# Patient Record
Sex: FEMALE | Race: WHITE
[De-identification: names, ages, dates, MRNs, and addresses within clinical notes are randomized per-mention and may not be internally consistent; named-entity substitution may affect disease eponyms.]

---

## 2018-06-08 ENCOUNTER — APPOINTMENT (OUTPATIENT)
Dept: MATERNAL FETAL MEDICINE | Facility: CLINIC | Age: 43
End: 2018-06-08
Payer: COMMERCIAL

## 2018-06-08 VITALS — BODY MASS INDEX: 42.4 KG/M2 | WEIGHT: 210.31 LBS | HEIGHT: 59 IN

## 2018-06-08 DIAGNOSIS — Z78.9 OTHER SPECIFIED HEALTH STATUS: ICD-10-CM

## 2018-06-08 DIAGNOSIS — Z86.32 PERSONAL HISTORY OF GESTATIONAL DIABETES: ICD-10-CM

## 2018-06-08 PROBLEM — Z00.00 ENCOUNTER FOR PREVENTIVE HEALTH EXAMINATION: Status: ACTIVE | Noted: 2018-06-08

## 2018-06-08 PROCEDURE — 99244 OFF/OP CNSLTJ NEW/EST MOD 40: CPT

## 2018-07-05 ENCOUNTER — APPOINTMENT (OUTPATIENT)
Dept: MATERNAL FETAL MEDICINE | Facility: CLINIC | Age: 43
End: 2018-07-05
Payer: COMMERCIAL

## 2018-07-05 ENCOUNTER — ASOB RESULT (OUTPATIENT)
Age: 43
End: 2018-07-05

## 2018-07-05 VITALS — HEIGHT: 59 IN | BODY MASS INDEX: 40.76 KG/M2 | WEIGHT: 202.19 LBS

## 2018-07-05 PROCEDURE — 99242 OFF/OP CONSLTJ NEW/EST SF 20: CPT | Mod: 25

## 2018-07-31 ENCOUNTER — APPOINTMENT (OUTPATIENT)
Dept: MATERNAL FETAL MEDICINE | Facility: CLINIC | Age: 43
End: 2018-07-31
Payer: COMMERCIAL

## 2018-07-31 VITALS — BODY MASS INDEX: 39.41 KG/M2 | WEIGHT: 195.5 LBS | HEIGHT: 59 IN

## 2018-07-31 DIAGNOSIS — E66.01 MORBID (SEVERE) OBESITY DUE TO EXCESS CALORIES: ICD-10-CM

## 2018-07-31 PROCEDURE — 99242 OFF/OP CONSLTJ NEW/EST SF 20: CPT

## 2018-09-24 ENCOUNTER — OTHER (OUTPATIENT)
Age: 43
End: 2018-09-24

## 2018-09-24 ENCOUNTER — APPOINTMENT (OUTPATIENT)
Dept: MATERNAL FETAL MEDICINE | Facility: CLINIC | Age: 43
End: 2018-09-24

## 2019-02-12 ENCOUNTER — APPOINTMENT (OUTPATIENT)
Dept: ENDOCRINOLOGY | Facility: CLINIC | Age: 44
End: 2019-02-12

## 2019-02-12 DIAGNOSIS — Z83.49 FAMILY HISTORY OF OTHER ENDOCRINE, NUTRITIONAL AND METABOLIC DISEASES: ICD-10-CM

## 2019-02-25 ENCOUNTER — TRANSCRIPTION ENCOUNTER (OUTPATIENT)
Age: 44
End: 2019-02-25

## 2019-12-05 ENCOUNTER — APPOINTMENT (OUTPATIENT)
Dept: ENDOCRINOLOGY | Facility: CLINIC | Age: 44
End: 2019-12-05
Payer: COMMERCIAL

## 2019-12-05 VITALS
DIASTOLIC BLOOD PRESSURE: 90 MMHG | HEART RATE: 80 BPM | OXYGEN SATURATION: 94 % | SYSTOLIC BLOOD PRESSURE: 130 MMHG | BODY MASS INDEX: 40.32 KG/M2 | HEIGHT: 59 IN | RESPIRATION RATE: 16 BRPM | WEIGHT: 200 LBS

## 2019-12-05 LAB — GLUCOSE BLDC GLUCOMTR-MCNC: 104

## 2019-12-05 PROCEDURE — 99214 OFFICE O/P EST MOD 30 MIN: CPT | Mod: 25

## 2019-12-05 PROCEDURE — 82962 GLUCOSE BLOOD TEST: CPT
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
## 2019-12-05 NOTE — ASSESSMENT
[FreeTextEntry1] : Given plans for another pregnancy, reviewed risks from uncontrolled DM/hypothyroidism\par - resume metformin  er 500mg 2 tabs qd. advised on off-label use of metformin in pregnancy\par - restart synthroid 50mcg. TSH goal < 2.5\par - monitor urine microalbumin\par - Current approaches to weight management are discussed with the patient. \par Suggested extensive nutritional education program. Proper dietary restrictions and exercise routines discussed. Different dietary plans reviewed. Weight loss goals set up. Patient wants to try an intermittent fasting diet.\par \par RTC 2 months with log book., or sooner if gets pregnant. Labs prior.\par \par

## 2020-02-19 ENCOUNTER — APPOINTMENT (OUTPATIENT)
Dept: ENDOCRINOLOGY | Facility: CLINIC | Age: 45
End: 2020-02-19
Payer: COMMERCIAL

## 2020-02-19 VITALS
OXYGEN SATURATION: 97 % | SYSTOLIC BLOOD PRESSURE: 124 MMHG | HEART RATE: 77 BPM | DIASTOLIC BLOOD PRESSURE: 70 MMHG | HEIGHT: 59 IN | BODY MASS INDEX: 41.53 KG/M2 | RESPIRATION RATE: 16 BRPM | WEIGHT: 206 LBS

## 2020-02-19 LAB — GLUCOSE BLDC GLUCOMTR-MCNC: 90

## 2020-02-19 PROCEDURE — 36415 COLL VENOUS BLD VENIPUNCTURE: CPT

## 2020-02-19 PROCEDURE — 99214 OFFICE O/P EST MOD 30 MIN: CPT | Mod: 25

## 2020-02-19 PROCEDURE — 82962 GLUCOSE BLOOD TEST: CPT
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
## 2020-02-19 NOTE — ASSESSMENT
[FreeTextEntry1] : Given plans for another pregnancy, reviewed risks from uncontrolled DM/hypothyroidism\par - cont metformin  er 500mg 2 tabs qd for now, might uptitrate post labs. advised on off-label use of metformin in pregnancy\par - cont synthroid 50mcg. TSH goal < 2.5\par - monitor urine microalbumin\par - Current approaches to weight management are discussed with the patient. \par Suggested extensive nutritional education program. Proper dietary restrictions and exercise routines discussed. Different dietary plans reviewed. Weight loss goals set up. Patient wants to try an intermittent fasting diet and calorie restriction. If decides against pregnancy, will add Victoza. She'll also see our CDE\par \par RTC 3 months with log book, or sooner if gets pregnant. Labs prior.\par \par

## 2020-02-20 LAB
25(OH)D3 SERPL-MCNC: 32.3 NG/ML
ALBUMIN SERPL ELPH-MCNC: 4.7 G/DL
ALP BLD-CCNC: 56 U/L
ALT SERPL-CCNC: 34 U/L
ANION GAP SERPL CALC-SCNC: 14 MMOL/L
AST SERPL-CCNC: 21 U/L
BILIRUB SERPL-MCNC: 0.2 MG/DL
BUN SERPL-MCNC: 12 MG/DL
CALCIUM SERPL-MCNC: 9.9 MG/DL
CHLORIDE SERPL-SCNC: 100 MMOL/L
CHOLEST SERPL-MCNC: 180 MG/DL
CHOLEST/HDLC SERPL: 3.1 RATIO
CO2 SERPL-SCNC: 23 MMOL/L
CREAT SERPL-MCNC: 0.49 MG/DL
CREAT SPEC-SCNC: 143 MG/DL
ESTIMATED AVERAGE GLUCOSE: 143 MG/DL
FRUCTOSAMINE SERPL-MCNC: 213 UMOL/L
GLUCOSE SERPL-MCNC: 97 MG/DL
HBA1C MFR BLD HPLC: 6.6 %
HDLC SERPL-MCNC: 58 MG/DL
IRON SATN MFR SERPL: 19 %
IRON SERPL-MCNC: 80 UG/DL
LDLC SERPL CALC-MCNC: 103 MG/DL
MICROALBUMIN 24H UR DL<=1MG/L-MCNC: <1.2 MG/DL
MICROALBUMIN/CREAT 24H UR-RTO: NORMAL MG/G
POTASSIUM SERPL-SCNC: 4.1 MMOL/L
PROT SERPL-MCNC: 7.6 G/DL
SODIUM SERPL-SCNC: 137 MMOL/L
T4 FREE SERPL-MCNC: 1.1 NG/DL
TIBC SERPL-MCNC: 425 UG/DL
TRIGL SERPL-MCNC: 95 MG/DL
TSH SERPL-ACNC: 6.84 UIU/ML
UIBC SERPL-MCNC: 345 UG/DL
VIT B12 SERPL-MCNC: 575 PG/ML

## 2020-02-25 ENCOUNTER — APPOINTMENT (OUTPATIENT)
Dept: ENDOCRINOLOGY | Facility: CLINIC | Age: 45
End: 2020-02-25
Payer: COMMERCIAL

## 2020-02-25 PROCEDURE — 97802 MEDICAL NUTRITION INDIV IN: CPT

## 2020-03-10 ENCOUNTER — APPOINTMENT (OUTPATIENT)
Dept: ENDOCRINOLOGY | Facility: CLINIC | Age: 45
End: 2020-03-10

## 2020-08-12 ENCOUNTER — APPOINTMENT (OUTPATIENT)
Dept: ENDOCRINOLOGY | Facility: CLINIC | Age: 45
End: 2020-08-12
Payer: MEDICAID

## 2020-08-12 VITALS
SYSTOLIC BLOOD PRESSURE: 120 MMHG | HEART RATE: 93 BPM | HEIGHT: 59 IN | BODY MASS INDEX: 42.33 KG/M2 | WEIGHT: 210 LBS | OXYGEN SATURATION: 98 % | DIASTOLIC BLOOD PRESSURE: 70 MMHG | RESPIRATION RATE: 17 BRPM

## 2020-08-12 DIAGNOSIS — E66.9 OBESITY, UNSPECIFIED: ICD-10-CM

## 2020-08-12 LAB
BASOPHILS # BLD AUTO: 0.08 K/UL
BASOPHILS NFR BLD AUTO: 1 %
EOSINOPHIL # BLD AUTO: 0.61 K/UL
EOSINOPHIL NFR BLD AUTO: 7.5 %
ESTIMATED AVERAGE GLUCOSE: 169 MG/DL
FRUCTOSAMINE SERPL-MCNC: 249 UMOL/L
GLUCOSE BLDC GLUCOMTR-MCNC: 137
HBA1C MFR BLD HPLC: 7.5 %
HCT VFR BLD CALC: 44.9 %
HGB BLD-MCNC: 14.1 G/DL
IMM GRANULOCYTES NFR BLD AUTO: 0.2 %
LYMPHOCYTES # BLD AUTO: 2.08 K/UL
LYMPHOCYTES NFR BLD AUTO: 25.4 %
MAN DIFF?: NORMAL
MCHC RBC-ENTMCNC: 28 PG
MCHC RBC-ENTMCNC: 31.4 GM/DL
MCV RBC AUTO: 89.1 FL
MONOCYTES # BLD AUTO: 0.53 K/UL
MONOCYTES NFR BLD AUTO: 6.5 %
NEUTROPHILS # BLD AUTO: 4.86 K/UL
NEUTROPHILS NFR BLD AUTO: 59.4 %
PLATELET # BLD AUTO: 340 K/UL
RBC # BLD: 5.04 M/UL
RBC # FLD: 13.9 %
WBC # FLD AUTO: 8.18 K/UL

## 2020-08-12 PROCEDURE — 99215 OFFICE O/P EST HI 40 MIN: CPT | Mod: 25

## 2020-08-12 PROCEDURE — 36415 COLL VENOUS BLD VENIPUNCTURE: CPT

## 2020-08-12 PROCEDURE — 82962 GLUCOSE BLOOD TEST: CPT

## 2020-08-12 NOTE — ASSESSMENT
[Diabetic Medications] : Risks and benefits of diabetic medications were discussed [FreeTextEntry1] : Given plans for another pregnancy, reviewed risks from uncontrolled DM/hypothyroidism\par - labs today and resume metformin. Likely will uptitrate to 2000 mg qd\par - advised on off-label use of metformin in pregnancy. Patient is still deciding whether she wants to proceed with pregnancy plans\par - cont synthroid 75 mcg. TSH goal < 2.5\par - monitor urine microalbumin\par - Current approaches to weight management are discussed with the patient. \par Suggested extensive nutritional education program. Proper dietary restrictions and exercise routines discussed. Different dietary plans reviewed. Weight loss goals set up. Patient wants to try an intermittent fasting diet and calorie restriction. If decides against pregnancy, will add Victoza or Rybelsus. She'll f/u with our CDE.\par \par RTC 3 months with log book, or sooner if gets pregnant. Labs prior.\par \par
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
## 2020-08-13 LAB
25(OH)D3 SERPL-MCNC: 44.2 NG/ML
ALBUMIN SERPL ELPH-MCNC: 4.7 G/DL
ALP BLD-CCNC: 52 U/L
ALT SERPL-CCNC: 56 U/L
ANION GAP SERPL CALC-SCNC: 16 MMOL/L
AST SERPL-CCNC: 43 U/L
BILIRUB SERPL-MCNC: 0.2 MG/DL
BUN SERPL-MCNC: 10 MG/DL
CALCIUM SERPL-MCNC: 9.5 MG/DL
CHLORIDE SERPL-SCNC: 102 MMOL/L
CHOLEST SERPL-MCNC: 172 MG/DL
CHOLEST/HDLC SERPL: 3 RATIO
CO2 SERPL-SCNC: 22 MMOL/L
CREAT SERPL-MCNC: 0.49 MG/DL
CREAT SPEC-SCNC: 142 MG/DL
FOLATE SERPL-MCNC: >20 NG/ML
GLUCOSE SERPL-MCNC: 142 MG/DL
HDLC SERPL-MCNC: 57 MG/DL
LDLC SERPL CALC-MCNC: 97 MG/DL
MICROALBUMIN 24H UR DL<=1MG/L-MCNC: 4.3 MG/DL
MICROALBUMIN/CREAT 24H UR-RTO: 30 MG/G
POTASSIUM SERPL-SCNC: 4.4 MMOL/L
PROT SERPL-MCNC: 7.3 G/DL
SODIUM SERPL-SCNC: 140 MMOL/L
T4 FREE SERPL-MCNC: 1.1 NG/DL
TRIGL SERPL-MCNC: 89 MG/DL
TSH SERPL-ACNC: 4.79 UIU/ML
VIT B12 SERPL-MCNC: 997 PG/ML

## 2020-09-15 ENCOUNTER — APPOINTMENT (OUTPATIENT)
Dept: ENDOCRINOLOGY | Facility: CLINIC | Age: 45
End: 2020-09-15

## 2020-11-12 ENCOUNTER — APPOINTMENT (OUTPATIENT)
Dept: ENDOCRINOLOGY | Facility: CLINIC | Age: 45
End: 2020-11-12
Payer: MEDICAID

## 2020-11-12 VITALS
SYSTOLIC BLOOD PRESSURE: 112 MMHG | HEART RATE: 95 BPM | BODY MASS INDEX: 41.93 KG/M2 | TEMPERATURE: 98.3 F | DIASTOLIC BLOOD PRESSURE: 70 MMHG | RESPIRATION RATE: 17 BRPM | OXYGEN SATURATION: 97 % | WEIGHT: 208 LBS | HEIGHT: 59 IN

## 2020-11-12 LAB — GLUCOSE BLDC GLUCOMTR-MCNC: 161

## 2020-11-12 PROCEDURE — 36415 COLL VENOUS BLD VENIPUNCTURE: CPT

## 2020-11-12 PROCEDURE — 82962 GLUCOSE BLOOD TEST: CPT

## 2020-11-12 PROCEDURE — 99214 OFFICE O/P EST MOD 30 MIN: CPT | Mod: 25

## 2020-11-12 PROCEDURE — 99072 ADDL SUPL MATRL&STAF TM PHE: CPT
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
## 2020-11-12 NOTE — ASSESSMENT
[Diabetic Medications] : Risks and benefits of diabetic medications were discussed [FreeTextEntry1] : Reviewed risks from uncontrolled DM/hypothyroidism\par - at present patient wants to focus on her DM management; advised on c/ceptive options\par - labs today\par - resume metformin er 500 mg 2 tabs w/ dinner and uptitrate as tolerated\par - advised on GLP1 agonists ; she's reluctant or use injectables. Rybelsus is not covered. Will try Steglatro 5 mg qd\par - cont synthroid 88 mcg. TSH goal < 2.5\par - monitor urine microalbumin\par - Current approaches to weight management are discussed with the patient. \par Suggested extensive nutritional education program. Proper dietary restrictions and exercise routines discussed. Different dietary plans reviewed. Weight loss goals set up.She'll f/u with our CDE and will refer to a bariatric surgeon\par \par RTC 3 months with log book\par \par

## 2020-11-13 LAB
25(OH)D3 SERPL-MCNC: 48.8 NG/ML
ALBUMIN SERPL ELPH-MCNC: 4.8 G/DL
ALP BLD-CCNC: 59 U/L
ALT SERPL-CCNC: 44 U/L
ANION GAP SERPL CALC-SCNC: 16 MMOL/L
AST SERPL-CCNC: 28 U/L
BILIRUB SERPL-MCNC: 0.2 MG/DL
BUN SERPL-MCNC: 13 MG/DL
CALCIUM SERPL-MCNC: 9.9 MG/DL
CHLORIDE SERPL-SCNC: 100 MMOL/L
CHOLEST SERPL-MCNC: 149 MG/DL
CO2 SERPL-SCNC: 23 MMOL/L
CREAT SERPL-MCNC: 0.55 MG/DL
CREAT SPEC-SCNC: 142 MG/DL
ESTIMATED AVERAGE GLUCOSE: 186 MG/DL
FOLATE SERPL-MCNC: 10.9 NG/ML
FRUCTOSAMINE SERPL-MCNC: 265 UMOL/L
GLUCOSE SERPL-MCNC: 158 MG/DL
HBA1C MFR BLD HPLC: 8.1 %
HDLC SERPL-MCNC: 58 MG/DL
LDLC SERPL CALC-MCNC: 79 MG/DL
MICROALBUMIN 24H UR DL<=1MG/L-MCNC: <1.2 MG/DL
MICROALBUMIN/CREAT 24H UR-RTO: NORMAL MG/G
NONHDLC SERPL-MCNC: 92 MG/DL
POTASSIUM SERPL-SCNC: 4.5 MMOL/L
PROT SERPL-MCNC: 7.3 G/DL
SODIUM SERPL-SCNC: 140 MMOL/L
T4 FREE SERPL-MCNC: 1.3 NG/DL
TRIGL SERPL-MCNC: 64 MG/DL
TSH SERPL-ACNC: 2.93 UIU/ML
VIT B12 SERPL-MCNC: 1657 PG/ML

## 2020-11-30 ENCOUNTER — APPOINTMENT (OUTPATIENT)
Dept: ENDOCRINOLOGY | Facility: CLINIC | Age: 45
End: 2020-11-30
Payer: MEDICAID

## 2020-11-30 PROCEDURE — 97802 MEDICAL NUTRITION INDIV IN: CPT

## 2020-12-14 ENCOUNTER — APPOINTMENT (OUTPATIENT)
Dept: ENDOCRINOLOGY | Facility: CLINIC | Age: 45
End: 2020-12-14

## 2021-02-22 ENCOUNTER — APPOINTMENT (OUTPATIENT)
Dept: ENDOCRINOLOGY | Facility: CLINIC | Age: 46
End: 2021-02-22
Payer: MEDICAID

## 2021-02-22 VITALS
SYSTOLIC BLOOD PRESSURE: 125 MMHG | DIASTOLIC BLOOD PRESSURE: 80 MMHG | WEIGHT: 209 LBS | BODY MASS INDEX: 42.13 KG/M2 | RESPIRATION RATE: 17 BRPM | TEMPERATURE: 98.1 F | OXYGEN SATURATION: 98 % | HEIGHT: 59 IN | HEART RATE: 98 BPM

## 2021-02-22 LAB — GLUCOSE BLDC GLUCOMTR-MCNC: 107

## 2021-02-22 PROCEDURE — 36415 COLL VENOUS BLD VENIPUNCTURE: CPT

## 2021-02-22 PROCEDURE — 82962 GLUCOSE BLOOD TEST: CPT

## 2021-02-22 PROCEDURE — 99072 ADDL SUPL MATRL&STAF TM PHE: CPT

## 2021-02-22 PROCEDURE — 99214 OFFICE O/P EST MOD 30 MIN: CPT | Mod: 25

## 2021-02-22 NOTE — ASSESSMENT
[Diabetic Medications] : Risks and benefits of diabetic medications were discussed [FreeTextEntry1] : Reviewed risks from uncontrolled DM/hypothyroidism\par - at present patient wants to focus on her DM management; advised on c/ceptive options\par - labs today \par - resume metformin er 500 mg 2 tabs w/ dinner and uptitrate as tolerated\par - advised on GLP1 agonists ; she's reluctant or use injectables. will reapply for Rybelsus since she has a new insurance. \par - cont synthroid 100 mcg. TSH goal < 2.5\par - monitor urine microalbumin\par - Current approaches to weight management are discussed with the patient. \par Suggested extensive nutritional education program. Proper dietary restrictions and exercise routines discussed. Different dietary plans reviewed. Weight loss goals set up.She'll f/u with our CDE and will refer to a bariatric surgeon\par \par RTC 3 months with log book\par \par
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
## 2021-02-23 LAB
25(OH)D3 SERPL-MCNC: 32.9 NG/ML
ALBUMIN SERPL ELPH-MCNC: 4.4 G/DL
ALP BLD-CCNC: 73 U/L
ALT SERPL-CCNC: 51 U/L
ANION GAP SERPL CALC-SCNC: 15 MMOL/L
AST SERPL-CCNC: 43 U/L
BILIRUB SERPL-MCNC: 0.3 MG/DL
BUN SERPL-MCNC: 12 MG/DL
CALCIUM SERPL-MCNC: 9.6 MG/DL
CHLORIDE SERPL-SCNC: 100 MMOL/L
CHOLEST SERPL-MCNC: 177 MG/DL
CO2 SERPL-SCNC: 23 MMOL/L
CREAT SERPL-MCNC: 0.65 MG/DL
CREAT SPEC-SCNC: 212 MG/DL
ESTIMATED AVERAGE GLUCOSE: 183 MG/DL
FOLATE SERPL-MCNC: 9.3 NG/ML
FRUCTOSAMINE SERPL-MCNC: 252 UMOL/L
GLUCOSE SERPL-MCNC: 110 MG/DL
HBA1C MFR BLD HPLC: 8 %
HDLC SERPL-MCNC: 51 MG/DL
LDLC SERPL CALC-MCNC: 110 MG/DL
MICROALBUMIN 24H UR DL<=1MG/L-MCNC: 3.6 MG/DL
MICROALBUMIN/CREAT 24H UR-RTO: 17 MG/G
NONHDLC SERPL-MCNC: 126 MG/DL
POTASSIUM SERPL-SCNC: 4.1 MMOL/L
PROT SERPL-MCNC: 7.5 G/DL
SODIUM SERPL-SCNC: 137 MMOL/L
T4 FREE SERPL-MCNC: 1.3 NG/DL
TRIGL SERPL-MCNC: 85 MG/DL
TSH SERPL-ACNC: 2.69 UIU/ML
VIT B12 SERPL-MCNC: >2000 PG/ML

## 2021-03-08 ENCOUNTER — TRANSCRIPTION ENCOUNTER (OUTPATIENT)
Age: 46
End: 2021-03-08

## 2021-06-10 ENCOUNTER — APPOINTMENT (OUTPATIENT)
Dept: ENDOCRINOLOGY | Facility: CLINIC | Age: 46
End: 2021-06-10
Payer: MEDICAID

## 2021-06-10 VITALS
RESPIRATION RATE: 17 BRPM | HEIGHT: 59 IN | WEIGHT: 209 LBS | BODY MASS INDEX: 42.13 KG/M2 | OXYGEN SATURATION: 98 % | HEART RATE: 84 BPM | TEMPERATURE: 97.9 F | DIASTOLIC BLOOD PRESSURE: 70 MMHG | SYSTOLIC BLOOD PRESSURE: 120 MMHG

## 2021-06-10 DIAGNOSIS — E11.69 TYPE 2 DIABETES MELLITUS WITH OTHER SPECIFIED COMPLICATION: ICD-10-CM

## 2021-06-10 DIAGNOSIS — E66.9 TYPE 2 DIABETES MELLITUS WITH OTHER SPECIFIED COMPLICATION: ICD-10-CM

## 2021-06-10 LAB
25(OH)D3 SERPL-MCNC: 32.1 NG/ML
ALBUMIN SERPL ELPH-MCNC: 4.4 G/DL
ALP BLD-CCNC: 61 U/L
ALT SERPL-CCNC: 36 U/L
ANION GAP SERPL CALC-SCNC: 15 MMOL/L
AST SERPL-CCNC: 23 U/L
BILIRUB SERPL-MCNC: 0.3 MG/DL
BUN SERPL-MCNC: 11 MG/DL
CALCIUM SERPL-MCNC: 9.4 MG/DL
CHLORIDE SERPL-SCNC: 102 MMOL/L
CHOLEST SERPL-MCNC: 159 MG/DL
CO2 SERPL-SCNC: 22 MMOL/L
CREAT SERPL-MCNC: 0.53 MG/DL
CREAT SPEC-SCNC: 141 MG/DL
ESTIMATED AVERAGE GLUCOSE: 174 MG/DL
FOLATE SERPL-MCNC: 6.4 NG/ML
FRUCTOSAMINE SERPL-MCNC: 252 UMOL/L
GLUCOSE BLDC GLUCOMTR-MCNC: 146
GLUCOSE SERPL-MCNC: 154 MG/DL
HBA1C MFR BLD HPLC: 7.7 %
HDLC SERPL-MCNC: 55 MG/DL
LDLC SERPL CALC-MCNC: 92 MG/DL
MICROALBUMIN 24H UR DL<=1MG/L-MCNC: 6.5 MG/DL
MICROALBUMIN/CREAT 24H UR-RTO: 46 MG/G
NONHDLC SERPL-MCNC: 104 MG/DL
POTASSIUM SERPL-SCNC: 4.6 MMOL/L
PROT SERPL-MCNC: 6.9 G/DL
SODIUM SERPL-SCNC: 138 MMOL/L
T4 FREE SERPL-MCNC: 0.9 NG/DL
TRIGL SERPL-MCNC: 61 MG/DL
TSH SERPL-ACNC: 6.35 UIU/ML
VIT B12 SERPL-MCNC: 871 PG/ML

## 2021-06-10 PROCEDURE — 99072 ADDL SUPL MATRL&STAF TM PHE: CPT

## 2021-06-10 PROCEDURE — 99214 OFFICE O/P EST MOD 30 MIN: CPT | Mod: 25

## 2021-06-10 PROCEDURE — 36415 COLL VENOUS BLD VENIPUNCTURE: CPT

## 2021-06-10 PROCEDURE — 82962 GLUCOSE BLOOD TEST: CPT

## 2021-06-10 RX ORDER — DULAGLUTIDE 0.75 MG/.5ML
0.75 INJECTION, SOLUTION SUBCUTANEOUS
Qty: 4 | Refills: 11 | Status: DISCONTINUED | COMMUNITY
Start: 2020-11-30 | End: 2021-06-10

## 2021-06-10 NOTE — ASSESSMENT
[Diabetic Medications] : Risks and benefits of diabetic medications were discussed [FreeTextEntry1] : Reviewed risks from uncontrolled DM/hypothyroidism\par compliance is reiterated\par - at present patient wants to focus on her DM management; advised on c/ceptive options\par - labs today \par - resume metformin er 500 mg 2 tabs w/ dinner and uptitrate as tolerated\par - resume Rybelsus 3 mg qd for 1 mo, then can increase to 7 mg\par - resume synthroid 100 mcg. TSH goal < 2.5\par - monitor urine microalbumin\par - Current approaches to weight management are discussed with the patient. \par Suggested extensive nutritional education program. Proper dietary restrictions and exercise routines discussed. Different dietary plans reviewed. Weight loss goals set up.She'll f/u with our CDE and will refer to a bariatric surgeon\par \par RTC 3 months with log book\par \par
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
## 2021-10-07 ENCOUNTER — APPOINTMENT (OUTPATIENT)
Dept: ENDOCRINOLOGY | Facility: CLINIC | Age: 46
End: 2021-10-07
Payer: MEDICAID

## 2021-10-07 VITALS
BODY MASS INDEX: 43.14 KG/M2 | RESPIRATION RATE: 17 BRPM | HEIGHT: 59 IN | TEMPERATURE: 98 F | SYSTOLIC BLOOD PRESSURE: 140 MMHG | DIASTOLIC BLOOD PRESSURE: 86 MMHG | HEART RATE: 76 BPM | OXYGEN SATURATION: 98 % | WEIGHT: 214 LBS

## 2021-10-07 LAB
ALBUMIN SERPL ELPH-MCNC: 4.7 G/DL
ALP BLD-CCNC: 70 U/L
ALT SERPL-CCNC: 54 U/L
ANION GAP SERPL CALC-SCNC: 14 MMOL/L
AST SERPL-CCNC: 48 U/L
BASOPHILS # BLD AUTO: 0.06 K/UL
BASOPHILS NFR BLD AUTO: 0.8 %
BILIRUB SERPL-MCNC: 0.3 MG/DL
BUN SERPL-MCNC: 14 MG/DL
CALCIUM SERPL-MCNC: 10.3 MG/DL
CHLORIDE SERPL-SCNC: 100 MMOL/L
CHOLEST SERPL-MCNC: 164 MG/DL
CO2 SERPL-SCNC: 25 MMOL/L
CREAT SERPL-MCNC: 0.69 MG/DL
EOSINOPHIL # BLD AUTO: 0.74 K/UL
EOSINOPHIL NFR BLD AUTO: 10.4 %
ESTIMATED AVERAGE GLUCOSE: 203 MG/DL
FOLATE SERPL-MCNC: 9.8 NG/ML
FRUCTOSAMINE SERPL-MCNC: 269 UMOL/L
GLUCOSE BLDC GLUCOMTR-MCNC: 134
GLUCOSE SERPL-MCNC: 126 MG/DL
HBA1C MFR BLD HPLC: 8.7 %
HCT VFR BLD CALC: 46.7 %
HDLC SERPL-MCNC: 49 MG/DL
HGB BLD-MCNC: 14.4 G/DL
IMM GRANULOCYTES NFR BLD AUTO: 0.1 %
LDLC SERPL CALC-MCNC: 102 MG/DL
LYMPHOCYTES # BLD AUTO: 1.94 K/UL
LYMPHOCYTES NFR BLD AUTO: 27.2 %
MAN DIFF?: NORMAL
MCHC RBC-ENTMCNC: 26.9 PG
MCHC RBC-ENTMCNC: 30.8 GM/DL
MCV RBC AUTO: 87.1 FL
MONOCYTES # BLD AUTO: 0.52 K/UL
MONOCYTES NFR BLD AUTO: 7.3 %
NEUTROPHILS # BLD AUTO: 3.85 K/UL
NEUTROPHILS NFR BLD AUTO: 54.2 %
NONHDLC SERPL-MCNC: 115 MG/DL
PLATELET # BLD AUTO: 371 K/UL
POTASSIUM SERPL-SCNC: 5.1 MMOL/L
PROT SERPL-MCNC: 7.8 G/DL
RBC # BLD: 5.36 M/UL
RBC # FLD: 13.5 %
SODIUM SERPL-SCNC: 139 MMOL/L
T4 FREE SERPL-MCNC: 1.1 NG/DL
TRIGL SERPL-MCNC: 66 MG/DL
TSH SERPL-ACNC: 8.45 UIU/ML
VIT B12 SERPL-MCNC: 753 PG/ML
WBC # FLD AUTO: 7.12 K/UL

## 2021-10-07 PROCEDURE — 99214 OFFICE O/P EST MOD 30 MIN: CPT | Mod: 25

## 2021-10-07 PROCEDURE — 82962 GLUCOSE BLOOD TEST: CPT

## 2021-10-07 PROCEDURE — 36415 COLL VENOUS BLD VENIPUNCTURE: CPT

## 2021-10-07 RX ORDER — ERTUGLIFLOZIN 5 MG/1
5 TABLET, FILM COATED ORAL
Qty: 30 | Refills: 6 | Status: ACTIVE | COMMUNITY
Start: 2020-11-12 | End: 1900-01-01
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
## 2021-10-07 NOTE — ASSESSMENT
[Diabetic Medications] : Risks and benefits of diabetic medications were discussed [FreeTextEntry1] : Reviewed risks from uncontrolled DM/hypothyroidism\par compliance is reiterated\par - at present patient wants to focus on her DM management; advised on c/ceptive options\par - labs today \par - increase Rybelsus 14 mg; will likely need to resume metformin er 500 mg 2 tabs w/ dinner\par - synthroid 100 mcg. TSH goal < 2.5\par - monitor urine microalbumin\par - Current approaches to weight management are discussed with the patient. \par Suggested extensive nutritional education program. Proper dietary restrictions and exercise routines discussed. Different dietary plans reviewed. Weight loss goals set up.She'll f/u with our CDE and will refer to a bariatric surgeon. We discussed different weight loss meds ans Plenity, she'll research\par \par RTC 3 months with log book\par \par

## 2021-10-08 ENCOUNTER — TRANSCRIPTION ENCOUNTER (OUTPATIENT)
Age: 46
End: 2021-10-08

## 2021-10-11 ENCOUNTER — TRANSCRIPTION ENCOUNTER (OUTPATIENT)
Age: 46
End: 2021-10-11

## 2021-10-12 ENCOUNTER — APPOINTMENT (OUTPATIENT)
Dept: ENDOCRINOLOGY | Facility: CLINIC | Age: 46
End: 2021-10-12

## 2021-10-25 ENCOUNTER — TRANSCRIPTION ENCOUNTER (OUTPATIENT)
Age: 46
End: 2021-10-25

## 2021-11-05 ENCOUNTER — TRANSCRIPTION ENCOUNTER (OUTPATIENT)
Age: 46
End: 2021-11-05

## 2021-11-22 ENCOUNTER — TRANSCRIPTION ENCOUNTER (OUTPATIENT)
Age: 46
End: 2021-11-22

## 2022-01-27 ENCOUNTER — TRANSCRIPTION ENCOUNTER (OUTPATIENT)
Age: 47
End: 2022-01-27

## 2022-02-03 ENCOUNTER — LABORATORY RESULT (OUTPATIENT)
Age: 47
End: 2022-02-03

## 2022-02-03 ENCOUNTER — APPOINTMENT (OUTPATIENT)
Dept: ENDOCRINOLOGY | Facility: CLINIC | Age: 47
End: 2022-02-03
Payer: COMMERCIAL

## 2022-02-03 VITALS
HEIGHT: 59 IN | SYSTOLIC BLOOD PRESSURE: 150 MMHG | OXYGEN SATURATION: 97 % | WEIGHT: 218 LBS | BODY MASS INDEX: 43.95 KG/M2 | DIASTOLIC BLOOD PRESSURE: 110 MMHG | RESPIRATION RATE: 17 BRPM | HEART RATE: 94 BPM | TEMPERATURE: 97.7 F

## 2022-02-03 LAB — GLUCOSE BLDC GLUCOMTR-MCNC: 213

## 2022-02-03 PROCEDURE — 82962 GLUCOSE BLOOD TEST: CPT

## 2022-02-03 PROCEDURE — 36415 COLL VENOUS BLD VENIPUNCTURE: CPT

## 2022-02-03 PROCEDURE — 99215 OFFICE O/P EST HI 40 MIN: CPT | Mod: 25

## 2022-02-03 RX ORDER — ORAL SEMAGLUTIDE 7 MG/1
7 TABLET ORAL
Qty: 3 | Refills: 3 | Status: DISCONTINUED | COMMUNITY
Start: 2021-02-22 | End: 2022-02-03
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
## 2022-02-03 NOTE — ASSESSMENT
[Diabetic Medications] : Risks and benefits of diabetic medications were discussed [FreeTextEntry1] : 1. T2DM, severely uncontrolled\par Reviewed risks from uncontrolled DM\par compliance is reiterated\par - at present patient wants to focus on her DM management; advised on c/ceptive options\par - labs today \par - advised on Bethanie PRO, insulin- she declined\par - resume  metformin er 500 mg 2 tabs w/ dinner, and uptitrate to 4 tabs as tolerated\par - Ozempic 0.25 gm q and uptitrate\par - Amaryl 2 mg bid\par - will not use SGLT2 inhibitors for now b/o UTI's\par \par 2. Hypothyroidisim\par - advised on danger of uncontrolled hypothyroidism\par - resume synthroid 100 mcg. TSH goal < 2.5\par - monitor urine microalbumin\par \par 3. Morbid obesity\par - Current approaches to weight management are discussed with the patient. \par Suggested extensive nutritional education program. Proper dietary restrictions and exercise routines discussed. Different dietary plans reviewed. Weight loss goals set up.She'll f/u with our CDE and will refer to a bariatric surgeon again. We discussed different weight loss meds ans Plenity, she'll research\par - 24h UFC\par \par RTC 3 months with log book\par \par

## 2022-02-04 ENCOUNTER — TRANSCRIPTION ENCOUNTER (OUTPATIENT)
Age: 47
End: 2022-02-04

## 2022-02-04 LAB
25(OH)D3 SERPL-MCNC: 26 NG/ML
ALBUMIN SERPL ELPH-MCNC: 4.7 G/DL
ALP BLD-CCNC: 70 U/L
ALT SERPL-CCNC: 75 U/L
ANION GAP SERPL CALC-SCNC: 18 MMOL/L
APPEARANCE: ABNORMAL
AST SERPL-CCNC: 57 U/L
BASOPHILS # BLD AUTO: 0.07 K/UL
BASOPHILS NFR BLD AUTO: 1 %
BILIRUB SERPL-MCNC: 0.2 MG/DL
BILIRUBIN URINE: NEGATIVE
BLOOD URINE: NEGATIVE
BUN SERPL-MCNC: 14 MG/DL
CALCIUM SERPL-MCNC: 9.9 MG/DL
CHLORIDE SERPL-SCNC: 99 MMOL/L
CHOLEST SERPL-MCNC: 160 MG/DL
CO2 SERPL-SCNC: 21 MMOL/L
COLOR: YELLOW
CREAT SERPL-MCNC: 0.48 MG/DL
EOSINOPHIL # BLD AUTO: 0.75 K/UL
EOSINOPHIL NFR BLD AUTO: 10.9 %
ESTIMATED AVERAGE GLUCOSE: 232 MG/DL
FOLATE SERPL-MCNC: 8.8 NG/ML
FRUCTOSAMINE SERPL-MCNC: 314 UMOL/L
GLUCOSE QUALITATIVE U: NORMAL
GLUCOSE SERPL-MCNC: 217 MG/DL
HBA1C MFR BLD HPLC: 9.7 %
HCT VFR BLD CALC: 46 %
HDLC SERPL-MCNC: 55 MG/DL
HGB BLD-MCNC: 14.1 G/DL
IMM GRANULOCYTES NFR BLD AUTO: 0.4 %
KETONES URINE: ABNORMAL
LDLC SERPL CALC-MCNC: 88 MG/DL
LEUKOCYTE ESTERASE URINE: ABNORMAL
LYMPHOCYTES # BLD AUTO: 1.85 K/UL
LYMPHOCYTES NFR BLD AUTO: 26.9 %
MAN DIFF?: NORMAL
MCHC RBC-ENTMCNC: 27.2 PG
MCHC RBC-ENTMCNC: 30.7 GM/DL
MCV RBC AUTO: 88.8 FL
MONOCYTES # BLD AUTO: 0.41 K/UL
MONOCYTES NFR BLD AUTO: 6 %
NEUTROPHILS # BLD AUTO: 3.78 K/UL
NEUTROPHILS NFR BLD AUTO: 54.8 %
NITRITE URINE: NEGATIVE
NONHDLC SERPL-MCNC: 106 MG/DL
PH URINE: 5.5
PLATELET # BLD AUTO: 328 K/UL
POTASSIUM SERPL-SCNC: 4.3 MMOL/L
PROT SERPL-MCNC: 7.4 G/DL
PROTEIN URINE: ABNORMAL
RBC # BLD: 5.18 M/UL
RBC # FLD: 13.9 %
SODIUM SERPL-SCNC: 138 MMOL/L
SPECIFIC GRAVITY URINE: 1.03
T4 FREE SERPL-MCNC: 1 NG/DL
TRIGL SERPL-MCNC: 86 MG/DL
TSH SERPL-ACNC: 5.81 UIU/ML
UROBILINOGEN URINE: NORMAL
VIT B12 SERPL-MCNC: 657 PG/ML
WBC # FLD AUTO: 6.89 K/UL

## 2022-02-08 ENCOUNTER — TRANSCRIPTION ENCOUNTER (OUTPATIENT)
Age: 47
End: 2022-02-08

## 2022-02-14 ENCOUNTER — APPOINTMENT (OUTPATIENT)
Dept: ENDOCRINOLOGY | Facility: CLINIC | Age: 47
End: 2022-02-14
Payer: COMMERCIAL

## 2022-02-14 VITALS — HEIGHT: 59 IN | BODY MASS INDEX: 43.55 KG/M2 | WEIGHT: 216 LBS

## 2022-02-14 PROCEDURE — G0108 DIAB MANAGE TRN  PER INDIV: CPT

## 2022-03-01 ENCOUNTER — APPOINTMENT (OUTPATIENT)
Dept: BARIATRICS | Facility: CLINIC | Age: 47
End: 2022-03-01

## 2022-03-14 ENCOUNTER — APPOINTMENT (OUTPATIENT)
Dept: ENDOCRINOLOGY | Facility: CLINIC | Age: 47
End: 2022-03-14
Payer: COMMERCIAL

## 2022-03-14 VITALS — BODY MASS INDEX: 43.55 KG/M2 | HEIGHT: 59 IN | WEIGHT: 216 LBS

## 2022-03-14 PROCEDURE — G0108 DIAB MANAGE TRN  PER INDIV: CPT

## 2022-04-25 ENCOUNTER — APPOINTMENT (OUTPATIENT)
Dept: ENDOCRINOLOGY | Facility: CLINIC | Age: 47
End: 2022-04-25

## 2022-08-03 ENCOUNTER — RESULT REVIEW (OUTPATIENT)
Age: 47
End: 2022-08-03

## 2022-10-06 ENCOUNTER — RX RENEWAL (OUTPATIENT)
Age: 47
End: 2022-10-06

## 2023-01-11 ENCOUNTER — NON-APPOINTMENT (OUTPATIENT)
Age: 48
End: 2023-01-11

## 2023-01-23 ENCOUNTER — APPOINTMENT (OUTPATIENT)
Dept: ENDOCRINOLOGY | Facility: CLINIC | Age: 48
End: 2023-01-23

## 2023-09-20 ENCOUNTER — LABORATORY RESULT (OUTPATIENT)
Age: 48
End: 2023-09-20

## 2023-09-20 ENCOUNTER — APPOINTMENT (OUTPATIENT)
Dept: ENDOCRINOLOGY | Facility: CLINIC | Age: 48
End: 2023-09-20
Payer: COMMERCIAL

## 2023-09-20 VITALS
HEART RATE: 86 BPM | DIASTOLIC BLOOD PRESSURE: 95 MMHG | BODY MASS INDEX: 42.74 KG/M2 | HEIGHT: 59 IN | WEIGHT: 212 LBS | SYSTOLIC BLOOD PRESSURE: 150 MMHG | RESPIRATION RATE: 16 BRPM | TEMPERATURE: 98 F | OXYGEN SATURATION: 97 %

## 2023-09-20 LAB — GLUCOSE BLDC GLUCOMTR-MCNC: 159

## 2023-09-20 PROCEDURE — 36415 COLL VENOUS BLD VENIPUNCTURE: CPT

## 2023-09-20 PROCEDURE — 99215 OFFICE O/P EST HI 40 MIN: CPT | Mod: 25

## 2023-09-20 PROCEDURE — 82962 GLUCOSE BLOOD TEST: CPT

## 2023-09-20 RX ORDER — BLOOD-GLUCOSE SENSOR
EACH MISCELLANEOUS
Qty: 9 | Refills: 4 | Status: ACTIVE | COMMUNITY
Start: 2023-09-20 | End: 1900-01-01

## 2023-09-20 RX ORDER — METFORMIN ER 500 MG 500 MG/1
500 TABLET ORAL DAILY
Qty: 180 | Refills: 2 | Status: ACTIVE | COMMUNITY
Start: 2019-12-05 | End: 1900-01-01

## 2023-09-20 RX ORDER — SEMAGLUTIDE 1.34 MG/ML
2 INJECTION, SOLUTION SUBCUTANEOUS
Qty: 12 | Refills: 0 | Status: DISCONTINUED | COMMUNITY
Start: 2022-02-03 | End: 2023-09-20

## 2023-09-26 LAB
ALBUMIN SERPL ELPH-MCNC: 4.8 G/DL
ALP BLD-CCNC: 84 U/L
ALT SERPL-CCNC: 72 U/L
ANION GAP SERPL CALC-SCNC: 16 MMOL/L
AST SERPL-CCNC: 89 U/L
BASOPHILS # BLD AUTO: 0.05 K/UL
BASOPHILS NFR BLD AUTO: 0.5 %
BILIRUB SERPL-MCNC: 0.3 MG/DL
BUN SERPL-MCNC: 9 MG/DL
C PEPTIDE SERPL-MCNC: 2.9 NG/ML
CALCIUM SERPL-MCNC: 9.8 MG/DL
CHLORIDE SERPL-SCNC: 99 MMOL/L
CHOLEST SERPL-MCNC: 157 MG/DL
CO2 SERPL-SCNC: 23 MMOL/L
CREAT SERPL-MCNC: 0.48 MG/DL
CREAT SPEC-SCNC: 148 MG/DL
EGFR: 117 ML/MIN/1.73M2
EOSINOPHIL # BLD AUTO: 0.77 K/UL
EOSINOPHIL NFR BLD AUTO: 7.9 %
ESTIMATED AVERAGE GLUCOSE: 240 MG/DL
FOLATE SERPL-MCNC: 9.6 NG/ML
FRUCTOSAMINE SERPL-MCNC: 329 UMOL/L
GLUCOSE SERPL-MCNC: 159 MG/DL
HBA1C MFR BLD HPLC: 10 %
HCT VFR BLD CALC: 48.8 %
HDLC SERPL-MCNC: 60 MG/DL
HGB BLD-MCNC: 14.4 G/DL
IMM GRANULOCYTES NFR BLD AUTO: 0.3 %
INSULIN SERPL-MCNC: 9.6 UU/ML
LDLC SERPL CALC-MCNC: 82 MG/DL
LYMPHOCYTES # BLD AUTO: 2.26 K/UL
LYMPHOCYTES NFR BLD AUTO: 23.3 %
MAN DIFF?: NORMAL
MCHC RBC-ENTMCNC: 26 PG
MCHC RBC-ENTMCNC: 29.5 GM/DL
MCV RBC AUTO: 88.1 FL
MICROALBUMIN 24H UR DL<=1MG/L-MCNC: 2.9 MG/DL
MICROALBUMIN/CREAT 24H UR-RTO: 19 MG/G
MONOCYTES # BLD AUTO: 0.5 K/UL
MONOCYTES NFR BLD AUTO: 5.1 %
NEUTROPHILS # BLD AUTO: 6.11 K/UL
NEUTROPHILS NFR BLD AUTO: 62.9 %
NONHDLC SERPL-MCNC: 97 MG/DL
PLATELET # BLD AUTO: 320 K/UL
POTASSIUM SERPL-SCNC: 4.2 MMOL/L
PROT SERPL-MCNC: 7.6 G/DL
RBC # BLD: 5.54 M/UL
RBC # FLD: 15.8 %
SODIUM SERPL-SCNC: 138 MMOL/L
T4 FREE SERPL-MCNC: 1.1 NG/DL
TRIGL SERPL-MCNC: 81 MG/DL
TSH SERPL-ACNC: 5.07 UIU/ML
VIT B12 SERPL-MCNC: 475 PG/ML
WBC # FLD AUTO: 9.72 K/UL

## 2023-10-11 ENCOUNTER — APPOINTMENT (OUTPATIENT)
Dept: ENDOCRINOLOGY | Facility: CLINIC | Age: 48
End: 2023-10-11

## 2023-10-30 ENCOUNTER — APPOINTMENT (OUTPATIENT)
Dept: ENDOCRINOLOGY | Facility: CLINIC | Age: 48
End: 2023-10-30

## 2024-03-05 ENCOUNTER — RX RENEWAL (OUTPATIENT)
Age: 49
End: 2024-03-05

## 2024-03-05 RX ORDER — LEVOTHYROXINE SODIUM 0.07 MG/1
75 TABLET ORAL DAILY
Qty: 90 | Refills: 3 | Status: ACTIVE | COMMUNITY
Start: 2019-12-05 | End: 1900-01-01

## 2024-04-04 ENCOUNTER — APPOINTMENT (OUTPATIENT)
Dept: ENDOCRINOLOGY | Facility: CLINIC | Age: 49
End: 2024-04-04

## 2024-05-15 ENCOUNTER — NON-APPOINTMENT (OUTPATIENT)
Age: 49
End: 2024-05-15

## 2024-06-06 ENCOUNTER — APPOINTMENT (OUTPATIENT)
Dept: ENDOCRINOLOGY | Facility: CLINIC | Age: 49
End: 2024-06-06
Payer: COMMERCIAL

## 2024-06-06 VITALS
DIASTOLIC BLOOD PRESSURE: 80 MMHG | BODY MASS INDEX: 41.12 KG/M2 | RESPIRATION RATE: 16 BRPM | HEIGHT: 59 IN | OXYGEN SATURATION: 97 % | SYSTOLIC BLOOD PRESSURE: 126 MMHG | WEIGHT: 204 LBS | HEART RATE: 82 BPM | TEMPERATURE: 97.6 F

## 2024-06-06 DIAGNOSIS — E11.65 TYPE 2 DIABETES MELLITUS WITH HYPERGLYCEMIA: ICD-10-CM

## 2024-06-06 DIAGNOSIS — E78.5 HYPERLIPIDEMIA, UNSPECIFIED: ICD-10-CM

## 2024-06-06 DIAGNOSIS — E03.9 HYPOTHYROIDISM, UNSPECIFIED: ICD-10-CM

## 2024-06-06 DIAGNOSIS — E66.01 MORBID (SEVERE) OBESITY DUE TO EXCESS CALORIES: ICD-10-CM

## 2024-06-06 LAB — GLUCOSE BLDC GLUCOMTR-MCNC: 135

## 2024-06-06 PROCEDURE — 99214 OFFICE O/P EST MOD 30 MIN: CPT | Mod: 25

## 2024-06-06 PROCEDURE — 36415 COLL VENOUS BLD VENIPUNCTURE: CPT

## 2024-06-06 PROCEDURE — 82962 GLUCOSE BLOOD TEST: CPT

## 2024-06-06 NOTE — ASSESSMENT
[Diabetic Medications] : Risks and benefits of diabetic medications were discussed [FreeTextEntry1] : 1. T2DM, uncontrolled Severe non-compliance Reviewed risks from uncontrolled DM compliance is reiterated - at present patient wants to focus on her DM management; advised on c/ceptive options - labs today  - resume SMBG - resume  metformin er 500 mg 1 tab w/ dinner and monitor for GI issues - increase Mounjaro 5 mg qw (R+B) and uptitrate as directed - will not use SGLT2 inhibitors for now b/o UTI's  2. Hypothyroidism - advised on danger of uncontrolled hypothyroidism - will resume synthroid 75 mcg post labs. TSH goal < 2.5 - monitor urine microalbumin - Proper intake of levothyroxine is reviewed in details, including on an empty stomach, with water only, at least one hour before taking any medications, vitamins, or supplements and three-four hours before taking iron or calcium.  3. Morbid obesity - Current approaches to weight management are discussed with the patient.  Suggested extensive nutritional education program. Proper dietary restrictions and exercise routines discussed. Different dietary plans reviewed. Weight loss goals set up. She'll f/u with our CDE and will refer to a bariatric surgeon again.  24h UFC  RTC 3 months with log book. To see CDE in 2 weeks, and out endo NP in 4-6 weeks

## 2024-06-07 ENCOUNTER — TRANSCRIPTION ENCOUNTER (OUTPATIENT)
Age: 49
End: 2024-06-07

## 2024-06-07 LAB
ALBUMIN SERPL ELPH-MCNC: 4.3 G/DL
ALP BLD-CCNC: 74 U/L
ALT SERPL-CCNC: 37 U/L
ANION GAP SERPL CALC-SCNC: 15 MMOL/L
AST SERPL-CCNC: 53 U/L
BASOPHILS # BLD AUTO: 0.07 K/UL
BASOPHILS NFR BLD AUTO: 1.1 %
BILIRUB SERPL-MCNC: 0.2 MG/DL
BUN SERPL-MCNC: 15 MG/DL
CALCIUM SERPL-MCNC: 9.2 MG/DL
CHLORIDE SERPL-SCNC: 105 MMOL/L
CHOLEST SERPL-MCNC: 151 MG/DL
CO2 SERPL-SCNC: 18 MMOL/L
CREAT SERPL-MCNC: 0.45 MG/DL
CREAT SPEC-SCNC: 142 MG/DL
EGFR: 118 ML/MIN/1.73M2
EOSINOPHIL # BLD AUTO: 0.61 K/UL
EOSINOPHIL NFR BLD AUTO: 9.4 %
ESTIMATED AVERAGE GLUCOSE: 203 MG/DL
FOLATE SERPL-MCNC: 14.9 NG/ML
FRUCTOSAMINE SERPL-MCNC: 300 UMOL/L
GLUCOSE SERPL-MCNC: 123 MG/DL
HBA1C MFR BLD HPLC: 8.7 %
HCT VFR BLD CALC: 44 %
HDLC SERPL-MCNC: 44 MG/DL
HGB BLD-MCNC: 13.6 G/DL
IMM GRANULOCYTES NFR BLD AUTO: 0.3 %
LDLC SERPL CALC-MCNC: 93 MG/DL
LYMPHOCYTES # BLD AUTO: 1.83 K/UL
LYMPHOCYTES NFR BLD AUTO: 28.2 %
MAN DIFF?: NORMAL
MCHC RBC-ENTMCNC: 26.1 PG
MCHC RBC-ENTMCNC: 30.9 GM/DL
MCV RBC AUTO: 84.3 FL
MICROALBUMIN 24H UR DL<=1MG/L-MCNC: 1.2 MG/DL
MICROALBUMIN/CREAT 24H UR-RTO: NORMAL MG/G
MONOCYTES # BLD AUTO: 0.48 K/UL
MONOCYTES NFR BLD AUTO: 7.4 %
NEUTROPHILS # BLD AUTO: 3.49 K/UL
NEUTROPHILS NFR BLD AUTO: 53.6 %
NONHDLC SERPL-MCNC: 107 MG/DL
PLATELET # BLD AUTO: 220 K/UL
POTASSIUM SERPL-SCNC: 6.5 MMOL/L
PROT SERPL-MCNC: 7.4 G/DL
RBC # BLD: 5.22 M/UL
RBC # FLD: 16 %
SODIUM SERPL-SCNC: 136 MMOL/L
TRIGL SERPL-MCNC: 69 MG/DL
VIT B12 SERPL-MCNC: 319 PG/ML
WBC # FLD AUTO: 6.5 K/UL

## 2024-06-07 RX ORDER — GLIMEPIRIDE 2 MG/1
2 TABLET ORAL DAILY
Qty: 90 | Refills: 2 | Status: ACTIVE | COMMUNITY
Start: 2022-02-03 | End: 1900-01-01

## 2024-06-12 ENCOUNTER — NON-APPOINTMENT (OUTPATIENT)
Age: 49
End: 2024-06-12

## 2024-08-05 ENCOUNTER — APPOINTMENT (OUTPATIENT)
Dept: ENDOCRINOLOGY | Facility: CLINIC | Age: 49
End: 2024-08-05

## 2024-08-16 ENCOUNTER — APPOINTMENT (OUTPATIENT)
Dept: ENDOCRINOLOGY | Facility: CLINIC | Age: 49
End: 2024-08-16

## 2024-09-17 ENCOUNTER — NON-APPOINTMENT (OUTPATIENT)
Age: 49
End: 2024-09-17

## 2024-09-30 ENCOUNTER — NON-APPOINTMENT (OUTPATIENT)
Age: 49
End: 2024-09-30

## 2024-10-08 ENCOUNTER — APPOINTMENT (OUTPATIENT)
Dept: ENDOCRINOLOGY | Facility: CLINIC | Age: 49
End: 2024-10-08

## 2024-10-18 ENCOUNTER — NON-APPOINTMENT (OUTPATIENT)
Age: 49
End: 2024-10-18

## 2024-12-12 RX ORDER — TIRZEPATIDE 12.5 MG/.5ML
12.5 INJECTION, SOLUTION SUBCUTANEOUS
Qty: 3 | Refills: 0 | Status: ACTIVE | COMMUNITY
Start: 2024-12-12 | End: 1900-01-01

## 2024-12-27 ENCOUNTER — NON-APPOINTMENT (OUTPATIENT)
Age: 49
End: 2024-12-27

## 2024-12-29 ENCOUNTER — NON-APPOINTMENT (OUTPATIENT)
Age: 49
End: 2024-12-29

## 2025-01-20 ENCOUNTER — RX RENEWAL (OUTPATIENT)
Age: 50
End: 2025-01-20

## 2025-02-03 ENCOUNTER — APPOINTMENT (OUTPATIENT)
Dept: ENDOCRINOLOGY | Facility: CLINIC | Age: 50
End: 2025-02-03
Payer: COMMERCIAL

## 2025-02-03 VITALS
BODY MASS INDEX: 36.29 KG/M2 | SYSTOLIC BLOOD PRESSURE: 138 MMHG | HEART RATE: 93 BPM | DIASTOLIC BLOOD PRESSURE: 88 MMHG | RESPIRATION RATE: 16 BRPM | WEIGHT: 180 LBS | OXYGEN SATURATION: 95 % | HEIGHT: 59 IN | TEMPERATURE: 97.9 F

## 2025-02-03 DIAGNOSIS — E03.9 HYPOTHYROIDISM, UNSPECIFIED: ICD-10-CM

## 2025-02-03 DIAGNOSIS — E78.5 HYPERLIPIDEMIA, UNSPECIFIED: ICD-10-CM

## 2025-02-03 DIAGNOSIS — E66.01 MORBID (SEVERE) OBESITY DUE TO EXCESS CALORIES: ICD-10-CM

## 2025-02-03 DIAGNOSIS — E11.65 TYPE 2 DIABETES MELLITUS WITH HYPERGLYCEMIA: ICD-10-CM

## 2025-02-03 LAB
25(OH)D3 SERPL-MCNC: 39 NG/ML
ALBUMIN SERPL ELPH-MCNC: 4.8 G/DL
ALP BLD-CCNC: 66 U/L
ALT SERPL-CCNC: 18 U/L
ANION GAP SERPL CALC-SCNC: 12 MMOL/L
AST SERPL-CCNC: 16 U/L
BASOPHILS # BLD AUTO: 0.06 K/UL
BASOPHILS NFR BLD AUTO: 0.8 %
BILIRUB SERPL-MCNC: 0.2 MG/DL
BUN SERPL-MCNC: 19 MG/DL
CALCIUM SERPL-MCNC: 10 MG/DL
CHLORIDE SERPL-SCNC: 102 MMOL/L
CHOLEST SERPL-MCNC: 145 MG/DL
CO2 SERPL-SCNC: 27 MMOL/L
CREAT SERPL-MCNC: 0.68 MG/DL
EGFR: 106 ML/MIN/1.73M2
EOSINOPHIL # BLD AUTO: 0.62 K/UL
EOSINOPHIL NFR BLD AUTO: 8.8 %
ESTIMATED AVERAGE GLUCOSE: 134 MG/DL
FOLATE SERPL-MCNC: 5.9 NG/ML
FRUCTOSAMINE SERPL-MCNC: 220 UMOL/L
GLUCOSE BLDC GLUCOMTR-MCNC: 109
GLUCOSE SERPL-MCNC: 109 MG/DL
HBA1C MFR BLD HPLC: 6.3 %
HCT VFR BLD CALC: 48.7 %
HDLC SERPL-MCNC: 44 MG/DL
HGB BLD-MCNC: 14.9 G/DL
IMM GRANULOCYTES NFR BLD AUTO: 0.3 %
LDLC SERPL CALC-MCNC: 90 MG/DL
LYMPHOCYTES # BLD AUTO: 1.73 K/UL
LYMPHOCYTES NFR BLD AUTO: 24.5 %
MAN DIFF?: NORMAL
MCHC RBC-ENTMCNC: 27.2 PG
MCHC RBC-ENTMCNC: 30.6 G/DL
MCV RBC AUTO: 88.9 FL
MONOCYTES # BLD AUTO: 0.47 K/UL
MONOCYTES NFR BLD AUTO: 6.6 %
NEUTROPHILS # BLD AUTO: 4.17 K/UL
NEUTROPHILS NFR BLD AUTO: 59 %
NONHDLC SERPL-MCNC: 101 MG/DL
PLATELET # BLD AUTO: 336 K/UL
POTASSIUM SERPL-SCNC: 4.9 MMOL/L
PROT SERPL-MCNC: 8.2 G/DL
RBC # BLD: 5.48 M/UL
RBC # FLD: 13.4 %
SODIUM SERPL-SCNC: 141 MMOL/L
T3 SERPL-MCNC: 124 NG/DL
T4 FREE SERPL-MCNC: 1.3 NG/DL
THYROGLOB AB SERPL-ACNC: 35.4 IU/ML
THYROPEROXIDASE AB SERPL IA-ACNC: 146 IU/ML
TRIGL SERPL-MCNC: 51 MG/DL
TSH SERPL-ACNC: 3.65 UIU/ML
VIT B12 SERPL-MCNC: 1260 PG/ML
WBC # FLD AUTO: 7.07 K/UL

## 2025-02-03 PROCEDURE — 95251 CONT GLUC MNTR ANALYSIS I&R: CPT

## 2025-02-03 PROCEDURE — 82962 GLUCOSE BLOOD TEST: CPT

## 2025-02-03 PROCEDURE — 99214 OFFICE O/P EST MOD 30 MIN: CPT

## 2025-02-03 PROCEDURE — 95250 CONT GLUC MNTR PHYS/QHP EQP: CPT

## 2025-02-03 PROCEDURE — 36415 COLL VENOUS BLD VENIPUNCTURE: CPT

## 2025-02-04 LAB
CREAT SPEC-SCNC: 147 MG/DL
MICROALBUMIN 24H UR DL<=1MG/L-MCNC: <1.2 MG/DL
MICROALBUMIN/CREAT 24H UR-RTO: NORMAL MG/G

## 2025-02-18 ENCOUNTER — APPOINTMENT (OUTPATIENT)
Dept: ENDOCRINOLOGY | Facility: CLINIC | Age: 50
End: 2025-02-18
Payer: COMMERCIAL

## 2025-02-18 VITALS — WEIGHT: 179 LBS | BODY MASS INDEX: 36.08 KG/M2 | HEIGHT: 59 IN

## 2025-02-18 PROCEDURE — G0108 DIAB MANAGE TRN  PER INDIV: CPT

## 2025-03-14 ENCOUNTER — RX RENEWAL (OUTPATIENT)
Age: 50
End: 2025-03-14

## 2025-06-05 ENCOUNTER — RX RENEWAL (OUTPATIENT)
Age: 50
End: 2025-06-05

## 2025-06-05 RX ORDER — TIRZEPATIDE 12.5 MG/.5ML
12.5 INJECTION, SOLUTION SUBCUTANEOUS
Qty: 1 | Refills: 4 | Status: ACTIVE | COMMUNITY
Start: 2025-06-05 | End: 1900-01-01

## 2025-06-25 ENCOUNTER — RX RENEWAL (OUTPATIENT)
Age: 50
End: 2025-06-25

## 2025-06-25 RX ORDER — TIRZEPATIDE 5 MG/.5ML
5 INJECTION, SOLUTION SUBCUTANEOUS
Qty: 4 | Refills: 4 | Status: ACTIVE | COMMUNITY
Start: 2025-06-25 | End: 1900-01-01

## 2025-07-31 ENCOUNTER — APPOINTMENT (OUTPATIENT)
Dept: ENDOCRINOLOGY | Facility: CLINIC | Age: 50
End: 2025-07-31

## 2025-08-05 ENCOUNTER — TRANSCRIPTION ENCOUNTER (OUTPATIENT)
Age: 50
End: 2025-08-05